# Patient Record
(demographics unavailable — no encounter records)

---

## 2024-11-27 NOTE — ASSESSMENT
[FreeTextEntry1] : Discussed PSA results with patient- undetectable.  Repeat PSA in 3 months. Next follow-up appointment in 3 months. All questions answered patient agreeable with plan. 20-minute discussion for prostate cancer follow-up and review of labs.

## 2024-11-27 NOTE — ADDENDUM
[FreeTextEntry1] : I, Amber Freedman assisted in documentation on 11/25/2024 at acting as a scribe for and in the presence of Dr. Chandra Fair.

## 2024-11-27 NOTE — HISTORY OF PRESENT ILLNESS
[FreeTextEntry1] : 71 yo male presents today for a follow-up appointment for prostate cancer.  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 All regional lymph nodes negative for tumor.  Recent PSA: <0.01 (11.18.24) Previous PSA: <0.01 (7.15.24)  Patient is doing well overall. Denies any urinary complaints at this time.

## 2024-11-27 NOTE — HISTORY OF PRESENT ILLNESS
[FreeTextEntry1] : 73 yo male presents today for a follow-up appointment for prostate cancer.  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 All regional lymph nodes negative for tumor.  Recent PSA: <0.01 (11.18.24) Previous PSA: <0.01 (7.15.24)  Patient is doing well overall. Denies any urinary complaints at this time.

## 2024-12-12 NOTE — HISTORY OF PRESENT ILLNESS
[FreeTextEntry1] : pt here to establish care med refills and consultation [de-identified] : no chest pain, no sob, no cough, no fever, no dizziness, no abdominal pain, no n/v/d/c/melena/brbpr/hematuria/dysuria Dr [place] cardiology  at home 120-130 systolic following with cardiology for BP

## 2024-12-12 NOTE — ASSESSMENT
[FreeTextEntry1] : continue with cardio  continue bp monitoring at home if systolic >140 rto or cardio  followup labs  cologuard ordered  following with urology for prostate

## 2024-12-12 NOTE — HEALTH RISK ASSESSMENT
[Good] : ~his/her~  mood as  good [Yes] : Yes [2 - 3 times a week (3 pts)] : 2 - 3  times a week (3 points) [1 or 2 (0 pts)] : 1 or 2 (0 points) [Never (0 pts)] : Never (0 points) [No] : In the past 12 months have you used drugs other than those required for medical reasons? No [No falls in past year] : Patient reported no falls in the past year [Little interest or pleasure doing things] : 1) Little interest or pleasure doing things [Feeling down, depressed, or hopeless] : 2) Feeling down, depressed, or hopeless [0] : 2) Feeling down, depressed, or hopeless: Not at all (0) [PHQ-2 Negative - No further assessment needed] : PHQ-2 Negative - No further assessment needed [Patient reported colonoscopy was normal] : Patient reported colonoscopy was normal [None] : None [With Significant Other] : lives with significant other [Retired] : retired [] :  [# Of Children ___] : has [unfilled] children [Feels Safe at Home] : Feels safe at home [Fully functional (bathing, dressing, toileting, transferring, walking, feeding)] : Fully functional (bathing, dressing, toileting, transferring, walking, feeding) [Fully functional (using the telephone, shopping, preparing meals, housekeeping, doing laundry, using] : Fully functional and needs no help or supervision to perform IADLs (using the telephone, shopping, preparing meals, housekeeping, doing laundry, using transportation, managing medications and managing finances) [Never] : Never [Audit-CScore] : 3 [[id number]] : 0 [Reports changes in hearing] : Reports no changes in hearing [Reports changes in vision] : Reports no changes in vision [Reports changes in dental health] : Reports no changes in dental health [ColonoscopyDate] : 2010

## 2025-03-16 NOTE — ASSESSMENT
[FreeTextEntry1] : Discussed PSA results with patient- undetectable. No complaints at this time.  Does not want to be on ED medication at this time. Repeat PSA in 4 months. Next follow-up appointment in 4 months. All questions answered patient agreeable with plan. 20-minute discussion for prostate cancer follow-up and review of labs.

## 2025-03-16 NOTE — HISTORY OF PRESENT ILLNESS
[FreeTextEntry1] : 74 yo male with history of prostate cancer presents today for a follow-up visit. 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 All regional lymph nodes negative for tumor.  Recent PSA: <0.01 (2.27.25) previously <0.01 on (11.18.24)  Patient is doing well overall. Continent and potent.

## 2025-03-16 NOTE — ADDENDUM
[FreeTextEntry1] : I, Amber Freedman assisted in documentation on 03/10/2025 at acting as a scribe for and in the presence of Dr. Chandra Fair.

## 2025-07-14 NOTE — ASSESSMENT
[FreeTextEntry1] : Discussed PSA results with patient- remains undetectable. Repeat PSA in 6 months. Next follow-up appointment in 6 months. All questions answered and patient agreeable with plan. 2discussion regarding longitudinal complex prostate cancer follow-up. Time spent is for reviewing chart, labs and images (if available), counseling and care coordination.

## 2025-07-14 NOTE — ADDENDUM
[FreeTextEntry1] : Ana SAMANO NP, assisted with documentation on 07/14/2025 in the presence and under the direction of Dr. Chandra Fair.  not observed

## 2025-07-14 NOTE — HISTORY OF PRESENT ILLNESS
[FreeTextEntry1] : 74 y/o male with history of prostate cancer presents today for a follow-up visit. 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 All regional lymph nodes negative for tumor.  Recent PSA: <0.01 (7.7.25) previously <0.01 on (2.27.25)   Patient is doing well overall. Continent and potent.

## 2025-07-14 NOTE — ADDENDUM
[FreeTextEntry1] : Ana SAMANO NP, assisted with documentation on 07/14/2025 in the presence and under the direction of Dr. Chandra Fair.

## 2025-07-14 NOTE — HISTORY OF PRESENT ILLNESS
[FreeTextEntry1] : 72 y/o male with history of prostate cancer presents today for a follow-up visit. 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 All regional lymph nodes negative for tumor.  Recent PSA: <0.01 (7.7.25) previously <0.01 on (2.27.25)   Patient is doing well overall. Continent and potent.

## 2025-07-25 NOTE — COUNSELING
"Patient was given the \"observation\" handout. Patient was given the opportunity to ask questions regarding status. No questions at this time.    " [Good understanding] : Patient has a good understanding of disease, goals and obesity follow-up plan

## 2025-07-29 NOTE — ASSESSMENT
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 co  -infection with Anaplasma On Therapy with Azithromycin, Doxycycline, and Mepron  PLAN:  LABS:  CBC, CMP, LDH,  Tick studies              Continue Doxycycline for 10 day course               Continue 7 days of Mepron and Azithromycin                Phone F/U  7/320/25                 Return Friday 8/1    [Treatment Education] : treatment education

## 2025-07-29 NOTE — REASON FOR VISIT
[Post Hospitalization] : a post hospitalization visit [Spouse] : spouse [FreeTextEntry1] : fu from hospital stay ([facility]) for tick borne illness continues taking Doxycycline, Azithromycin, and Atovaquone c/o fatigue, sweats, and fevers (on/off)

## 2025-07-29 NOTE — REVIEW OF SYSTEMS
[Fever] : fever [Chills] : chills [Feeling Sick] : feeling sick [Feeling Tired] : feeling tired [Negative] : Heme/Lymph [Difficulty Sleeping] : no difficulty sleeping [Normal Appetite] : appetite not normal  [Eye Pain] : no eye pain [Red Eyes] : eyes not red [Eyesight Problems] : no eyesight problems [Discharge From Eyes] : no purulent discharge from the eyes [Earache] : no earache [Loss Of Hearing] : no hearing loss [Nasal Discharge] : no nasal discharge [Sore Throat] : no sore throat [Hoarseness] : no hoarseness [Chest Pain] : no chest pain [Palpitations] : no palpitations [Leg Claudication] : no intermittent leg claudication [Lower Ext Edema] : no extremity edema [Shortness Of Breath] : no shortness of breath [Wheezing] : no wheezing [Cough] : no cough [SOB on Exertion] : no shortness of breath during exertion [Sputum] : not coughing up ~M sputum [Pleuritic Chest Pain] : no pleuritic chest pain [Abdominal Pain] : no abdominal pain [Vomiting] : no vomiting [Constipation] : no constipation [Diarrhea] : no diarrhea [Dysuria] : no dysuria [FreeTextEntry2] : + Rigors

## 2025-07-29 NOTE — HISTORY OF PRESENT ILLNESS
[FreeTextEntry1] : 73 Male with Acute Babesiosis, Dx at [facility] on D#  3  Azithromycin and Mepron  and D#  4 Doxycycline.  Had anemia, thrombocytopenia and a positive peripheral smear for Babesia. PMH:  CAD,  Elev PSA, HTN, HLD NO Hx splenectomy   LABS  7/27/25:  WBC  5.1,  Hgb  12.9,  PLT  56,  Creat  111.2,  AST  158  ABD US  7/29/25:  Cholelithiasis, gallbladder wall thickening,  Hepatomegaly, No mention of spleen

## 2025-07-29 NOTE — PHYSICAL EXAM
[General Appearance - Alert] : alert [General Appearance - In No Acute Distress] : in no acute distress [General Appearance - Well Nourished] : well nourished [General Appearance - Well-Appearing] : healthy appearing [Sclera] : the sclera and conjunctiva were normal [PERRL With Normal Accommodation] : pupils were equal in size, round, reactive to light [Extraocular Movements] : extraocular movements were intact [Outer Ear] : the ears and nose were normal in appearance [Hearing Threshold Finger Rub Not Frontier] : hearing was normal [Examination Of The Oral Cavity] : the lips and gums were normal [Oropharynx] : the oropharynx was normal with no thrush [Neck Appearance] : the appearance of the neck was normal [Neck Cervical Mass (___cm)] : no neck mass was observed [Jugular Venous Distention Increased] : there was no jugular-venous distention [Thyroid Diffuse Enlargement] : the thyroid was not enlarged [Respiration, Rhythm And Depth] : normal respiratory rhythm and effort [Exaggerated Use Of Accessory Muscles For Inspiration] : no accessory muscle use [Auscultation Breath Sounds / Voice Sounds] : lungs were clear to auscultation bilaterally [Heart Rate And Rhythm] : heart rate was normal and rhythm regular [Heart Sounds] : normal S1 and S2 [Heart Sounds Gallop] : no gallops [Murmurs] : no murmurs [Heart Sounds Pericardial Friction Rub] : no pericardial rub [Full Pulse] : the pedal pulses are present [Edema] : there was no peripheral edema [Bowel Sounds] : normal bowel sounds [Abdomen Soft] : soft [Abdomen Tenderness] : non-tender [Abdomen Mass (___ Cm)] : no abdominal mass palpated [Costovertebral Angle Tenderness] : no CVA tenderness [No Palpable Adenopathy] : no palpable adenopathy [Cervical Lymph Nodes Enlarged Posterior Bilaterally] : posterior cervical [Cervical Lymph Nodes Enlarged Anterior Bilaterally] : anterior cervical [Musculoskeletal - Swelling] : no joint swelling [Range of Motion to Joints] : range of motion to joints [Nail Clubbing] : no clubbing  or cyanosis of the fingernails [Motor Tone] : muscle strength and tone were normal [Skin Color & Pigmentation] : normal skin color and pigmentation [] : no rash [Skin Lesions] : no skin lesions [Cranial Nerves] : cranial nerves 2-12 were intact [Sensation] : the sensory exam was normal to light touch and pinprick [Motor Exam] : the motor exam was normal [No Focal Deficits] : no focal deficits [Oriented To Time, Place, And Person] : oriented to person, place, and time [Affect] : the affect was normal [FreeTextEntry1] : + rigors

## 2025-07-30 NOTE — PLAN
[FreeTextEntry1] : I&D tick bite was treated with 200 mg doxycycline x 1 dose by urgent care within 72 hours of the tick bite  Check labs  Counseling given Advised check for ticks after golfing - Use tick repellent.  Cardio - Hypertension - Patient was educated about hypertension and the importance of controlling the pressure through lifestyle modification which include low sodium diet and aerobic exercise.  Also discussed the use of prescription medication which included their benefits and their side effects. We discussed the use of ASA 81 mg daily.   Having a BMI less than or equal to 25. Continue HCTZ- Norvasc Lisinopril and metoprolol. Advised the need to have fasting labs   hx Pre-diabetes - Advised Low glycemic diet - RTO for fasting labs  Consider hyperglycemic treatment if A1C is over 6   I spent 25 Minutes with the patient, half of which we discussed finding on physical exam and coordinated care.  As well as reviewed my plans and follow ups. Dragon speech recognition software was used to create portions of this document.  An attempt at proofreading has been made to minimize errors please call if any questions arise.

## 2025-07-30 NOTE — PHYSICAL EXAM
[No Acute Distress] : no acute distress [Well Nourished] : well nourished [Well Developed] : well developed [Well-Appearing] : well-appearing [Normal Sclera/Conjunctiva] : normal sclera/conjunctiva [PERRL] : pupils equal round and reactive to light [EOMI] : extraocular movements intact [Normal Outer Ear/Nose] : the outer ears and nose were normal in appearance [Normal Oropharynx] : the oropharynx was normal [No JVD] : no jugular venous distention [No Lymphadenopathy] : no lymphadenopathy [Supple] : supple [Thyroid Normal, No Nodules] : the thyroid was normal and there were no nodules present [No Respiratory Distress] : no respiratory distress  [No Accessory Muscle Use] : no accessory muscle use [Clear to Auscultation] : lungs were clear to auscultation bilaterally [Normal Rate] : normal rate  [Regular Rhythm] : with a regular rhythm [Normal S1, S2] : normal S1 and S2 [No Carotid Bruits] : no carotid bruits [No Abdominal Bruit] : a ~M bruit was not heard ~T in the abdomen [Pedal Pulses Present] : the pedal pulses are present [No Edema] : there was no peripheral edema [No Palpable Aorta] : no palpable aorta [No Extremity Clubbing/Cyanosis] : no extremity clubbing/cyanosis [Soft] : abdomen soft [Non Tender] : non-tender [Non-distended] : non-distended [No Masses] : no abdominal mass palpated [No HSM] : no HSM [Normal Bowel Sounds] : normal bowel sounds [Normal Posterior Cervical Nodes] : no posterior cervical lymphadenopathy [Normal Anterior Cervical Nodes] : no anterior cervical lymphadenopathy [No CVA Tenderness] : no CVA  tenderness [No Spinal Tenderness] : no spinal tenderness [No Joint Swelling] : no joint swelling [Grossly Normal Strength/Tone] : grossly normal strength/tone [No Rash] : no rash [Coordination Grossly Intact] : coordination grossly intact [No Focal Deficits] : no focal deficits [Normal Gait] : normal gait [Speech Grossly Normal] : speech grossly normal [Normal Affect] : the affect was normal [Alert and Oriented x3] : oriented to person, place, and time [Normal Mood] : the mood was normal [Normal Insight/Judgement] : insight and judgment were intact [Normal TMs] : both tympanic membranes were normal [Thin Hair] : thin hair [de-identified] : umbilical/ ventral hernia  [de-identified] : good ROM of c-spine

## 2025-07-30 NOTE — ASSESSMENT
[FreeTextEntry1] : Mr. LEDEZMA is a 73 year-old male, with a past medical history as noted above, who present to the office today for follow up on a tick bite

## 2025-07-30 NOTE — HISTORY OF PRESENT ILLNESS
[FreeTextEntry1] : Follow up on tick bite [de-identified] : Mr. LEDEZMA is a 73 year-old-male, with a past medical history as noted below, who present to the office today for follow up on a tick bite- Had a tick bite 4 weeks ago - went to urgent care was given 2 tabs for one day.  States this happened about 4 weeks ago.  Now feeling tired and having joint pain (neck area). Unaware of another tick bite since the initial encounter  States he takes all his medication on a regular basis w/o side effect

## 2025-07-30 NOTE — HISTORY OF PRESENT ILLNESS
[FreeTextEntry1] : Follow up on tick bite [de-identified] : Mr. LEDEZMA is a 73 year-old-male, with a past medical history as noted below, who present to the office today for follow up on a tick bite- Had a tick bite 4 weeks ago - went to urgent care was given 2 tabs for one day.  States this happened about 4 weeks ago.  Now feeling tired and having joint pain (neck area). Unaware of another tick bite since the initial encounter  States he takes all his medication on a regular basis w/o side effect

## 2025-07-30 NOTE — REVIEW OF SYSTEMS
[Chills] : chills [Negative] : Heme/Lymph [Fatigue] : fatigue [Claudication] : leg claudication [Fever] : no fever [Night Sweats] : no night sweats [Recent Change In Weight] : ~T no recent weight change [Discharge] : no discharge [Pain] : no pain [Vision Problems] : no vision problems [Itching] : no itching [Earache] : no earache [Hearing Loss] : no hearing loss [Nasal Discharge] : no nasal discharge [Sore Throat] : no sore throat [Chest Pain] : no chest pain [Palpitations] : no palpitations [Lower Ext Edema] : no lower extremity edema [Orthopena] : no orthopnea [Paroxysmal Nocturnal Dyspnea] : no paroxysmal nocturnal dyspnea [Shortness Of Breath] : no shortness of breath [Wheezing] : no wheezing [Cough] : no cough [Nausea] : no nausea [Vomiting] : no vomiting [Heartburn] : no heartburn [Melena] : no melena [Dysuria] : no dysuria [Incontinence] : no incontinence [Nocturia] : no nocturia [Joint Pain] : no joint pain [Joint Stiffness] : no joint stiffness [Muscle Pain] : no muscle pain [Back Pain] : no back pain [Joint Swelling] : no joint swelling [Itching] : no itching [Mole Changes] : no mole changes [Skin Rash] : no skin rash [Headache] : no headache [Dizziness] : no dizziness [Fainting] : no fainting [Confusion] : no confusion [Insomnia] : no insomnia [Anxiety] : no anxiety [Depression] : no depression [Easy Bruising] : no easy bruising [Swollen Glands] : no swollen glands

## 2025-07-30 NOTE — PHYSICAL EXAM
[No Acute Distress] : no acute distress [Well Nourished] : well nourished [Well Developed] : well developed [Well-Appearing] : well-appearing [Normal Sclera/Conjunctiva] : normal sclera/conjunctiva [PERRL] : pupils equal round and reactive to light [EOMI] : extraocular movements intact [Normal Outer Ear/Nose] : the outer ears and nose were normal in appearance [Normal Oropharynx] : the oropharynx was normal [No JVD] : no jugular venous distention [No Lymphadenopathy] : no lymphadenopathy [Supple] : supple [Thyroid Normal, No Nodules] : the thyroid was normal and there were no nodules present [No Respiratory Distress] : no respiratory distress  [No Accessory Muscle Use] : no accessory muscle use [Clear to Auscultation] : lungs were clear to auscultation bilaterally [Normal Rate] : normal rate  [Regular Rhythm] : with a regular rhythm [Normal S1, S2] : normal S1 and S2 [No Carotid Bruits] : no carotid bruits [No Abdominal Bruit] : a ~M bruit was not heard ~T in the abdomen [Pedal Pulses Present] : the pedal pulses are present [No Edema] : there was no peripheral edema [No Palpable Aorta] : no palpable aorta [No Extremity Clubbing/Cyanosis] : no extremity clubbing/cyanosis [Soft] : abdomen soft [Non Tender] : non-tender [Non-distended] : non-distended [No Masses] : no abdominal mass palpated [No HSM] : no HSM [Normal Bowel Sounds] : normal bowel sounds [Normal Posterior Cervical Nodes] : no posterior cervical lymphadenopathy [Normal Anterior Cervical Nodes] : no anterior cervical lymphadenopathy [No CVA Tenderness] : no CVA  tenderness [No Spinal Tenderness] : no spinal tenderness [No Joint Swelling] : no joint swelling [Grossly Normal Strength/Tone] : grossly normal strength/tone [No Rash] : no rash [Coordination Grossly Intact] : coordination grossly intact [No Focal Deficits] : no focal deficits [Normal Gait] : normal gait [Speech Grossly Normal] : speech grossly normal [Normal Affect] : the affect was normal [Alert and Oriented x3] : oriented to person, place, and time [Normal Mood] : the mood was normal [Normal Insight/Judgement] : insight and judgment were intact [Normal TMs] : both tympanic membranes were normal [Thin Hair] : thin hair [de-identified] : umbilical/ ventral hernia  [de-identified] : good ROM of c-spine

## 2025-07-30 NOTE — HISTORY OF PRESENT ILLNESS
[FreeTextEntry1] : Follow up on tick bite [de-identified] : Mr. LEDEZMA is a 73 year-old-male, with a past medical history as noted below, who present to the office today for follow up on a tick bite- Had a tick bite 4 weeks ago - went to urgent care was given 2 tabs for one day.  States this happened about 4 weeks ago.  Now feeling tired and having joint pain (neck area). Unaware of another tick bite since the initial encounter  States he takes all his medication on a regular basis w/o side effect

## 2025-07-30 NOTE — PHYSICAL EXAM
[No Acute Distress] : no acute distress [Well Nourished] : well nourished [Well Developed] : well developed [Well-Appearing] : well-appearing [Normal Sclera/Conjunctiva] : normal sclera/conjunctiva [PERRL] : pupils equal round and reactive to light [EOMI] : extraocular movements intact [Normal Outer Ear/Nose] : the outer ears and nose were normal in appearance [Normal Oropharynx] : the oropharynx was normal [No JVD] : no jugular venous distention [No Lymphadenopathy] : no lymphadenopathy [Supple] : supple [Thyroid Normal, No Nodules] : the thyroid was normal and there were no nodules present [No Respiratory Distress] : no respiratory distress  [No Accessory Muscle Use] : no accessory muscle use [Clear to Auscultation] : lungs were clear to auscultation bilaterally [Normal Rate] : normal rate  [Regular Rhythm] : with a regular rhythm [Normal S1, S2] : normal S1 and S2 [No Carotid Bruits] : no carotid bruits [No Abdominal Bruit] : a ~M bruit was not heard ~T in the abdomen [Pedal Pulses Present] : the pedal pulses are present [No Edema] : there was no peripheral edema [No Palpable Aorta] : no palpable aorta [No Extremity Clubbing/Cyanosis] : no extremity clubbing/cyanosis [Soft] : abdomen soft [Non Tender] : non-tender [Non-distended] : non-distended [No Masses] : no abdominal mass palpated [No HSM] : no HSM [Normal Bowel Sounds] : normal bowel sounds [Normal Posterior Cervical Nodes] : no posterior cervical lymphadenopathy [Normal Anterior Cervical Nodes] : no anterior cervical lymphadenopathy [No CVA Tenderness] : no CVA  tenderness [No Spinal Tenderness] : no spinal tenderness [No Joint Swelling] : no joint swelling [Grossly Normal Strength/Tone] : grossly normal strength/tone [No Rash] : no rash [Coordination Grossly Intact] : coordination grossly intact [No Focal Deficits] : no focal deficits [Normal Gait] : normal gait [Speech Grossly Normal] : speech grossly normal [Normal Affect] : the affect was normal [Alert and Oriented x3] : oriented to person, place, and time [Normal Mood] : the mood was normal [Normal Insight/Judgement] : insight and judgment were intact [Normal TMs] : both tympanic membranes were normal [Thin Hair] : thin hair [de-identified] : umbilical/ ventral hernia  [de-identified] : good ROM of c-spine